# Patient Record
Sex: FEMALE | Race: BLACK OR AFRICAN AMERICAN | Employment: UNEMPLOYED | URBAN - METROPOLITAN AREA
[De-identification: names, ages, dates, MRNs, and addresses within clinical notes are randomized per-mention and may not be internally consistent; named-entity substitution may affect disease eponyms.]

---
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
## 2025-01-15 NOTE — PROGRESS NOTES
Chief Complaint   Patient presents with    New Patient    Abdominal Pain     cramps     Esvin Chapman is a 13 y.o. female former patient Dr. Jules Santillan here today for a well visit. She in 11th Grade. Will also need work permit and sports physical, participate in volleyball. Patient takes ibuprofen as needed for abdominal cramps, and can get severe throughout her menstrual cycle. Menarche at 15years old, 5 days duration every 28 to 30 days and usually heavy the first day. Interval concerns  ADD/ADHD: No  Behavior: No  Puberty: No  Weight: Yes  School:No  Other: Menstrual cramps    School  Interacts well with peers:Yes  Participates in extracurricular activities:Yes  School performance good   Bullying: No  Attendance: good     Nutrition/Exercise  Nutrition: eats a balanced diet  Soda intake: yes, 2x/week  Exercise: No    Dental Exam UTD: Yes  Eye Exam UTD: yes    Menses  Have you ever had a menstrual period? yes  How old were you when you had your first menstrual period? 15years old  How many periods have you had in the last year? 12  Are you able to maintain a normal schedule with your menses? Yes      Sports History  Previous Injury:No  Hx of concussion:No  Prior Restrictions on play:No  Short of breath with activity: No  Syncope/Presyncope:No  Palpitations: No  Chest Pain:No  Previous Cardiac Workup:No  Family Hx of Early Cardiac Death:No  Family Hx Cardiac Defects:No    PHQ-9 Total Score: 0 (8/17/2021  9:42 AM)      Current Outpatient Medications   Medication Sig Dispense Refill    ibuprofen (ADVIL;MOTRIN) 600 MG tablet Take 0.5 tablets by mouth every 8 hours as needed for Pain 90 tablet 0     No current facility-administered medications for this visit.       No Known Allergies     Objective:   BP 94/66 (Site: Left Upper Arm, Position: Sitting, Cuff Size: Large Adult)   Pulse 76   Temp 95.8 °F (35.4 °C)   Ht 5' 1.4\" (1.56 m)   Wt 181 lb (82.1 kg)   LMP 08/15/2021 (Exact Date)   SpO2 98%   BMI 33.76
Pt here to [facility] care, also has a c/o not being able to pass a bm daily. Also c/o horrible ab cramps during her cycle and for PMS. Concern, pt talking about a pain in her chest. After talking to pt, seems to be having anxiety / panic attacks. Discussed shaving of underarms and how to clean razor. Pt also talking about lower back pain.
Yes...